# Patient Record
Sex: FEMALE | Race: WHITE | Employment: UNEMPLOYED | URBAN - METROPOLITAN AREA
[De-identification: names, ages, dates, MRNs, and addresses within clinical notes are randomized per-mention and may not be internally consistent; named-entity substitution may affect disease eponyms.]

---

## 2018-11-08 NOTE — PROGRESS NOTES
Sreekanth Cabral is a 21year old female.     CC:  Patient presents with:  Establish Care  Well Adult: NP annual visit, no pap      HPI:  Annual Physical due on 04/30/1997  HPV Vaccines(1 of 3 - Female 3 Dose Series) due on 04/30/2006  Annual Depression Sc date: 2017        Years since quittin.0      Smokeless tobacco: Never Used    Alcohol use: No      Frequency: Never    Drug use: No           Immunization History  Administered            Date(s) Administered    Influenza             2018 masses, HSM or tenderness  :  Deferred to gyn  MUSCULOSKELETAL: back is not tender,FROM of the back  EXTREMITIES: no cyanosis, clubbing or edema  SKIN: no rashes,no suspicious lesions  NEURO: Oriented times three, cranial nerves are intact, motor and sen

## 2018-11-11 PROBLEM — F32.A ANXIETY AND DEPRESSION: Status: ACTIVE | Noted: 2018-11-11

## 2018-11-11 PROBLEM — F41.9 ANXIETY AND DEPRESSION: Status: ACTIVE | Noted: 2018-11-11

## 2018-11-11 PROBLEM — Z86.2 HISTORY OF ANEMIA: Status: ACTIVE | Noted: 2018-11-11

## 2018-11-13 NOTE — TELEPHONE ENCOUNTER
Called and spoke to patient regarding physical form. Forms were faxed at [phone], received confirmation, copy sent to scan. Original form has been picked up by patient.

## 2019-03-04 NOTE — TELEPHONE ENCOUNTER
Medication(s) to Refill:   Requested Prescriptions     Pending Prescriptions Disp Refills   • Levonorgestrel-Ethinyl Estrad 0.1-20 MG-MCG Oral Tab 28 tablet 2     Sig: Take 1 tablet by mouth daily.          Reason for Medication Refill being sent to Provide

## 2019-04-04 NOTE — PROGRESS NOTES
[facility] Family Medicine Office Note  Chief Complaint:   Patient presents with:  Irregular Periods: last menstrual cycle 2 months ago, negative at home pregnancy test, on and off cramps      HPI:   This is a 21year old female coming in for  HP of this encounter: 184 lb. Vital signs reviewed. Appears stated age, well groomed. Physical Exam   Constitutional: She is oriented to person, place, and time. She appears well-developed and well-nourished.    Eyes: Conjunctivae and EOM are normal. Pupils

## 2019-07-09 NOTE — PROGRESS NOTES
Chief Complaint:   Patient presents with:  Ear Pain: left ear pain started today     HPI:She    This is a 25year old female presenting for evaluation of cough/chest congestion and left ear pain. Cough and chest congestion started about 1 week ago.  No ches Oral Tab Take 2 tablets (40 mg total) by mouth daily for 5 days. Disp: 10 tablet Rfl: 0   benzonatate 100 MG Oral Cap Take 1-2 capsules (100-200 mg total) by mouth 3 (three) times daily as needed.  Disp: 30 capsule Rfl: 0   Budesonide-Formoterol Fumarate (S HENT:   Head: Normocephalic and atraumatic. Right Ear: Hearing, tympanic membrane, external ear and ear canal normal.   Nose: Right sinus exhibits maxillary sinus tenderness. Right sinus exhibits no frontal sinus tenderness.  Left sinus exhibits maxilla Symbicort prn.   - Albuterol Sulfate  (90 Base) MCG/ACT Inhalation Aero Soln; Inhale 1-2 puffs into the lungs every 6 (six) hours as needed. -Cxr in 1 week if no improvement, sooner if worsening symptoms.

## 2019-11-26 NOTE — PROGRESS NOTES
Jorge Yanes is a 25year old female. CC:  Patient presents with:   Well Adult: annual visit, no pap smear       HPI:  HPV Vaccines(1 - Female 3-dose series) due on 04/30/2010  Pap Smear,3 Years due on 04/30/2016  Annual Physical due on 11/08/2019 History:   Procedure Laterality Date   • ADENOIDECTOMY     • REMOVAL GALLBLADDER      11/2017   • SPINE SCOLIOSIS SUPINE/UPRIGHT      2010   • TONSILLECTOMY        Family History   Problem Relation Age of Onset   • Other (lung cancer) Father 64   • Other ( denies food or seasonal allergies  PSYCH: no symptoms of depression or anxiety, depression screening negative. EXAM:   /70   Pulse 86   Temp 97.9 °F (36.6 °C) (Oral)   Resp 16   Ht 64\"   Wt 181 lb (82.1 kg)   LMP 11/13/2019   SpO2 99%   BMI 31. tablet (0.25 mg total) by mouth nightly as needed. • traMADol HCl 50 MG Oral Tab 30 tablet 0     Sig: Take 1 tablet (50 mg total) by mouth every 8 (eight) hours as needed for Pain. Imaging & Consults:  None    Return for   1. pap smear   2.  Skin

## 2021-01-21 NOTE — TELEPHONE ENCOUNTER
Patient's most recent labs have been faxed to [address] [place] [address] at [phone], received confirmation

## 2021-03-25 NOTE — TELEPHONE ENCOUNTER
Received Medical records thru fax in [place] today from [facility]. This pt has an appt with you on April 7th -New pt for problem gyne. Please review pt records. Gave to , working in [place] today.

## 2021-04-07 PROBLEM — N94.6 DYSMENORRHEA: Status: ACTIVE | Noted: 2021-04-07

## 2021-04-07 PROBLEM — N83.201 RIGHT OVARIAN CYST: Status: ACTIVE | Noted: 2021-04-07

## 2021-04-07 PROBLEM — N92.6 IRREGULAR MENSES: Status: ACTIVE | Noted: 2021-04-07

## 2021-04-07 NOTE — PROGRESS NOTES
Subjective:  Patient presents with:  Establish Care: PT here to discuss hx of ovarian cyst with pain, BC or possible ablation     22year old female  presents for complaint of painful periods for the past 6 months, irregular bleeding and right ova Systems:  Pertinent items are noted in the HPI.     Objective:  /68   Ht 63.25\"   Wt 145 lb (65.8 kg)   LMP 03/30/2021 (Exact Date)   BMI 25.48 kg/m²    Physical Examination:  General appearance: Well dressed, well nourished in no apparent distress tablet by mouth daily for 28 days. Return for Ultrasound.

## 2021-04-23 NOTE — TELEPHONE ENCOUNTER
Pt requesting results of her ultrasound go to the clinic in [place] [address]. Also would like results of that ultrasound.     Please call

## 2021-04-23 NOTE — PROGRESS NOTES
Based on appearance and increase in size from February, hemorrhagic cyst most likely with endometrioma or dermoid less likely. Recommend follow up ultrasound 6 weeks. Order placed.

## 2021-04-23 NOTE — TELEPHONE ENCOUNTER
Based on appearance and increase in size from February, hemorrhagic cyst most likely with endometrioma or dermoid less likely. Recommend follow up ultrasound 6 weeks. Order placed and patient notified.     Patient reports continued nausea on lower dose OC

## 2021-04-23 NOTE — TELEPHONE ENCOUNTER
From: Piyush Pretty  To: Lizzy Jha MD  Sent: 4/23/2021 2:06 PM CDT  Subject: Test Results Question    Hi,  I see my pelvic ultrasound is in and the cyst is now 5.3 cm. Was curious what my next step will be?    Thank you

## 2021-04-23 NOTE — TELEPHONE ENCOUNTER
Pt had US done today at [address] [facility] [address] and calling for results. Pt sent [employer] message earlier requesting results. Message was forwarded to Dr. Cristino Meredith. Discussed with pt that Dr. Cristino Meredith will review the [address] and make recommendations.   As soon as we have that informat

## 2021-04-27 NOTE — TELEPHONE ENCOUNTER
PT will be sending request form from [place] [phone] to [facility] to release the US results to [place] [id number]

## 2021-04-28 NOTE — TELEPHONE ENCOUNTER
JOSE ALEJANDRO received in plfd. Can nurse release our ultrasound report dos 4/23/21 or does it have to go to MR Dept?      Form at

## 2021-04-29 NOTE — TELEPHONE ENCOUNTER
JOSE ALEJANDRO requesting radiology report AND images. Will need to obtain images from medical records. Copy of report given to [facility] staff to fax.

## 2021-04-30 NOTE — TELEPHONE ENCOUNTER
Nurse printed 4/23/21 ultrasound report. Faxed that to [facility] as requested on JOSE ALEJANDRO at [phone]. Faxed JOSE ALEJANDRO to scan stat for images of ultrasound to be sent. Faxed to [facility] scan stat [phone]. Copy in plfd.

## 2021-06-02 NOTE — TELEPHONE ENCOUNTER
Patient stopped taking OCP because of nausea. Reminded to schedule follow up ultrasound for follow up ovarian cyst.  Reviewed records from previous OB.   Patient had Mirena IUD in 2017 and had discussed both laparoscopy to rule out endometriosis and endome

## 2024-03-25 ENCOUNTER — OFFICE VISIT (OUTPATIENT)
Dept: FAMILY MEDICINE CLINIC | Facility: CLINIC | Age: 29
End: 2024-03-25
Payer: COMMERCIAL

## 2024-03-25 VITALS
HEART RATE: 88 BPM | OXYGEN SATURATION: 98 % | RESPIRATION RATE: 16 BRPM | DIASTOLIC BLOOD PRESSURE: 70 MMHG | SYSTOLIC BLOOD PRESSURE: 110 MMHG | BODY MASS INDEX: 24.98 KG/M2 | WEIGHT: 141 LBS | HEIGHT: 63 IN

## 2024-03-25 DIAGNOSIS — R94.31 ABNORMAL EKG: ICD-10-CM

## 2024-03-25 DIAGNOSIS — Q21.10 ASD (ATRIAL SEPTAL DEFECT) (HCC): ICD-10-CM

## 2024-03-25 DIAGNOSIS — Z01.818 PRE-OP EXAM: ICD-10-CM

## 2024-03-25 DIAGNOSIS — Z01.818 PREOP GENERAL PHYSICAL EXAM: Primary | ICD-10-CM

## 2024-03-25 LAB
ATRIAL RATE: 77 BPM
P AXIS: 42 DEGREES
P-R INTERVAL: 142 MS
Q-T INTERVAL: 402 MS
QRS DURATION: 94 MS
QTC CALCULATION (BEZET): 454 MS
R AXIS: 0 DEGREES
T AXIS: 20 DEGREES
VENTRICULAR RATE: 77 BPM

## 2024-03-25 RX ORDER — DULOXETIN HYDROCHLORIDE 60 MG/1
60 CAPSULE, DELAYED RELEASE ORAL DAILY
COMMUNITY

## 2024-03-25 RX ORDER — CYCLOBENZAPRINE HCL 10 MG
10 TABLET ORAL 3 TIMES DAILY PRN
COMMUNITY

## 2024-03-25 NOTE — PROGRESS NOTES
CC: Preop exam.    Jocelyn Lorenz is a 28 year old female who presents for a pre-operative physical exam  By Dr. Eugenio huynh. Patient is to have laparoscopic excision of endometriosis  to be on 4/8/24  No prior anaesthesia problem.      Current Outpatient Medications   Medication Sig Dispense Refill    DULoxetine 60 MG Oral Cap DR Particles Take 1 capsule (60 mg total) by mouth daily.      cyclobenzaprine 10 MG Oral Tab Take 1 tablet (10 mg total) by mouth 3 (three) times daily as needed for Muscle spasms.        Allergies: No Known Allergies   Past Medical History:   Diagnosis Date    Anxiety     Depression     Scoliosis       Past Surgical History:   Procedure Laterality Date    ADENOIDECTOMY      REMOVAL GALLBLADDER      11/2017    SPINE SCOLIOSIS SUPINE/UPRIGHT      2010    TONSILLECTOMY        Family History   Problem Relation Age of Onset    Other (lung cancer) Father 56    Other (thyroid (hypo)) Maternal Grandmother     Heart Disease Maternal Grandfather     Other (lung cancer) Paternal Grandfather       Social History:   Social History     Socioeconomic History    Marital status:    Tobacco Use    Smoking status: Every Day    Smokeless tobacco: Never   Vaping Use    Vaping Use: Never used   Substance and Sexual Activity    Alcohol use: No    Drug use: No    Sexual activity: Yes     Partners: Male     Birth control/protection: Pill   Other Topics Concern    Caffeine Concern Yes     Comment: 1 cup daily     Exercise Yes     Comment: walking and biking     Seat Belt Yes           REVIEW OF SYSTEMS:   GENERAL: feels well otherwise  SKIN: denies any unusual skin lesions  EYES:denies blurred vision or double vision  HEENT: denies nasal congestion, sinus pain or ST  LUNGS: denies shortness of breath with exertion  CARDIOVASCULAR: denies chest pain on exertion  GI: denies abdominal pain,denies heartburn  : no abnormal discharge  MUSCULOSKELETAL: denies back pain  NEURO: denies headaches  PSYCHE: denies  depression or anxiety  HEMATOLOGIC: denies hx of anemia  ENDOCRINE: denies thyroid history  ALL/ASTHMA: denies hx of allergy or asthma    EXAM:   /70   Pulse 88   Resp 16   Ht 5' 3\" (1.6 m)   Wt 141 lb (64 kg)   SpO2 98%   BMI 24.98 kg/m²   GENERAL: well developed,in no apparent distress  SKIN: no rashes,no suspicious lesions  HEENT: atraumatic, normocephalic,ears and throat are clear  EYES:PERRL, EOMI, normal optic disk,conjunctiva are clear  NECK: supple,no adenopathy,no bruits  CHEST: no chest tenderness  LUNGS: clear to auscultation  CARDIO: RRR without murmur  GI: good BS's,no masses, HSM or tenderness  MUSCULOSKELETAL: back is not tender,FROM of the back  EXTREMITIES: no cyanosis, clubbing or edema  NEURO: Oriented times three,cranial nerves are intact,motor and sensory are grossly intact    ASSESSMENT AND PLAN:   Jocelyn Lorenz is a 28 year old female who presents for a pre-operative physical exam. Labs stable, EKG  abnormal -echo and stress pending   Diagnoses and all orders for this visit:    Preop general physical exam  -     CARD ECHO 2D DOPPLER (CPT=93306); Future  -     CARD ECHO STRESS ECHO/REST AND STRESS(CPT=93350/85710 DMG 98471); Future    Pre-op exam  -     CBC With Differential With Platelet; Future  -     Comp Metabolic Panel (14); Future  -     EKG with interpretation and Report -IN OFFICE [19617]    Abnormal EKG  -     CARD ECHO 2D DOPPLER (CPT=93306); Future    ASD (atrial septal defect) (HCC)  -     CARD ECHO 2D DOPPLER (CPT=93306); Future  -     CARD ECHO STRESS ECHO/REST AND STRESS(CPT=93350/74077 DMG 66524); Future     Stable   F/u for worsening symptoms

## 2024-03-26 ENCOUNTER — HOSPITAL ENCOUNTER (OUTPATIENT)
Dept: CV DIAGNOSTICS | Facility: HOSPITAL | Age: 29
Discharge: HOME | End: 2024-03-26
Attending: NURSE PRACTITIONER
Payer: COMMERCIAL

## 2024-03-26 DIAGNOSIS — Q21.10 ASD (ATRIAL SEPTAL DEFECT) (HCC): ICD-10-CM

## 2024-03-26 DIAGNOSIS — Z01.818 PREOP GENERAL PHYSICAL EXAM: ICD-10-CM

## 2024-03-26 DIAGNOSIS — R94.31 ABNORMAL EKG: ICD-10-CM

## 2024-03-26 PROCEDURE — 93018 CV STRESS TEST I&R ONLY: CPT | Performed by: NURSE PRACTITIONER

## 2024-03-26 PROCEDURE — 93350 STRESS TTE ONLY: CPT | Performed by: NURSE PRACTITIONER

## 2024-03-26 PROCEDURE — 93017 CV STRESS TEST TRACING ONLY: CPT | Performed by: NURSE PRACTITIONER

## 2024-03-26 PROCEDURE — 93306 TTE W/DOPPLER COMPLETE: CPT | Performed by: NURSE PRACTITIONER

## 2024-03-28 ENCOUNTER — PATIENT MESSAGE (OUTPATIENT)
Dept: FAMILY MEDICINE CLINIC | Facility: CLINIC | Age: 29
End: 2024-03-28

## 2024-03-28 ENCOUNTER — MED REC SCAN ONLY (OUTPATIENT)
Dept: FAMILY MEDICINE CLINIC | Facility: CLINIC | Age: 29
End: 2024-03-28

## 2024-03-28 NOTE — TELEPHONE ENCOUNTER
From: Jocelyn Lorenz  To: Dwayne Encarnacion  Sent: 3/28/2024 11:52 AM CDT  Subject: Pre op    Mitch Rice, I just wanted to make you aware I saw cardiology and I will now be going for ACE and then following up with him on the 18th. I’m sure you can see records or they will fax you something but just wanted to let you know!

## 2024-04-04 NOTE — PAT NURSING NOTE
Per PAT encounter/[employer] message sent to pt:    PreOp Instructions     You are scheduled for: a Cardiac Procedure     Date of Procedure: 04/08/24 Monday     Diet Instructions: Do not eat or drink anything after midnight     Medications: Medications you are allowed to take can be taken with a sip of water the morning of your procedure     Medications to Stop: Hold herbal supplements and vitamins morning of the procedure (in case you start taking something new).      Arrival Time: The Friday prior to your procedure you will receive a phone call before 6:00 pm with your arrival time. If you haven't received a phone call, please check your voicemail messages., If you did not receive a voice mail and it is after 6:00 pm, please call the nursing supervisor at [phone].    Driving After Procedure: If sedation is given, you WILL NOT be able to drive home. You will need a responsible adult  to drive you home., Cannot take uber or cab unless approved by physician     Discharge Teaching: Your nurse will give you specific instructions before discharge, Most people can resume normal activities in 2-3 days, Any questions, please call the physician's office      parking is available starting at 6 am or park in the [place] parking garage at [facility]. Check in at the [facility] reception desk. Our  will be there to check you in for your procedure. Please bring your insurance cards and ID with you.                                                                                                                                      Please DO NOT respond to this message, the inbasket is not monitored for messages. For any questions, please call the physician's office.

## 2024-04-08 ENCOUNTER — HOSPITAL ENCOUNTER (OUTPATIENT)
Dept: CV DIAGNOSTICS | Facility: HOSPITAL | Age: 29
Discharge: HOME | End: 2024-04-08
Attending: INTERNAL MEDICINE
Payer: COMMERCIAL

## 2024-04-08 ENCOUNTER — HOSPITAL ENCOUNTER (OUTPATIENT)
Dept: INTERVENTIONAL RADIOLOGY/VASCULAR | Facility: HOSPITAL | Age: 29
Discharge: HOME | End: 2024-04-08
Attending: INTERNAL MEDICINE | Admitting: INTERNAL MEDICINE
Payer: COMMERCIAL

## 2024-04-08 VITALS
HEIGHT: 63 IN | WEIGHT: 140 LBS | TEMPERATURE: 98 F | SYSTOLIC BLOOD PRESSURE: 102 MMHG | HEART RATE: 66 BPM | RESPIRATION RATE: 12 BRPM | OXYGEN SATURATION: 99 % | BODY MASS INDEX: 24.8 KG/M2 | DIASTOLIC BLOOD PRESSURE: 69 MMHG

## 2024-04-08 DIAGNOSIS — Q21.10 ASD (ATRIAL SEPTAL DEFECT) (HCC): ICD-10-CM

## 2024-04-08 PROCEDURE — 93320 DOPPLER ECHO COMPLETE: CPT | Performed by: INTERNAL MEDICINE

## 2024-04-08 PROCEDURE — B24BZZ4 ULTRASONOGRAPHY OF HEART WITH AORTA, TRANSESOPHAGEAL: ICD-10-PCS | Performed by: INTERNAL MEDICINE

## 2024-04-08 PROCEDURE — 99152 MOD SED SAME PHYS/QHP 5/>YRS: CPT | Performed by: INTERNAL MEDICINE

## 2024-04-08 PROCEDURE — 93312 ECHO TRANSESOPHAGEAL: CPT | Performed by: INTERNAL MEDICINE

## 2024-04-08 PROCEDURE — 93325 DOPPLER ECHO COLOR FLOW MAPG: CPT | Performed by: INTERNAL MEDICINE

## 2024-04-08 RX ORDER — MIDAZOLAM HYDROCHLORIDE 1 MG/ML
INJECTION INTRAMUSCULAR; INTRAVENOUS
Status: COMPLETED
Start: 2024-04-08 | End: 2024-04-08

## 2024-04-08 RX ORDER — MIDAZOLAM HYDROCHLORIDE 1 MG/ML
1 INJECTION INTRAMUSCULAR; INTRAVENOUS EVERY 5 MIN PRN
Status: DISCONTINUED | OUTPATIENT
Start: 2024-04-08 | End: 2024-04-08

## 2024-04-08 RX ORDER — SODIUM CHLORIDE 9 MG/ML
INJECTION, SOLUTION INTRAVENOUS
Status: COMPLETED | OUTPATIENT
Start: 2024-04-09 | End: 2024-04-08

## 2024-04-08 RX ADMIN — SODIUM CHLORIDE: 9 INJECTION, SOLUTION INTRAVENOUS at 12:30:00

## 2024-04-08 RX ADMIN — MIDAZOLAM HYDROCHLORIDE 5 MG: 1 INJECTION INTRAMUSCULAR; INTRAVENOUS at 13:11:00

## 2024-04-08 NOTE — H&P
History & Physical Examination    Patient Name: Jocelyn Lorenz  MRN: [id number]  CSN: [id number]  YOB: 1995    Diagnosis: ASD    History of Present Illness: 29 yo with ?ASD here for ACE.  Denies difficulty swallowing or bleeding problems. No CP or SOB.    Medications Prior to Admission   Medication Sig Dispense Refill Last Dose    DULoxetine 60 MG Oral Cap DR Particles Take 1 capsule (60 mg total) by mouth daily.   4/7/2024    cyclobenzaprine 10 MG Oral Tab Take 1 tablet (10 mg total) by mouth 3 (three) times daily as needed for Muscle spasms.   4/7/2024       Allergies: No Known Allergies    Past Medical History:   Diagnosis Date    Anxiety     Depression     Scoliosis      Past Surgical History:   Procedure Laterality Date    ADENOIDECTOMY      HYSTERECTOMY  2022    REMOVAL GALLBLADDER      11/2017    SPINE SCOLIOSIS SUPINE/UPRIGHT      2010    TONSILLECTOMY       Family History   Problem Relation Age of Onset    Other (lung cancer) Father 56    Other (thyroid (hypo)) Maternal Grandmother     Heart Disease Maternal Grandfather     Other (lung cancer) Paternal Grandfather      Social History     Tobacco Use    Smoking status: Every Day    Smokeless tobacco: Never   Substance Use Topics    Alcohol use: No       SYSTEM Check if Review is Normal Check if Physical Exam is Normal If not normal, please explain:   HEENT [ x] [x ]    NECK & BACK [x ] [x ]    HEART [x ] [x ]    LUNGS [x ] [x ]    ABDOMEN [x ] [ x]    UROGENITAL [x ] [ defer]    EXTREMITIES [x ] [x ]    OTHER        Plan:   ( x)  I have discussed the risks,  benefits, and alternatives with the patient/family, they understand and agree to proceed with plan of care for 3D AEC with color flow and doppler.     Lambert John MD  4/8/2024  1:12 PM

## 2024-04-08 NOTE — PROGRESS NOTES
Pt s/p ACE. Pt tolerated procedure well. Suctioned clear secretions from mouth. VSS. Denied pain. Tolerated PO intake. IV d/c'd. Discharge instructions given-pt verbalized understanding. Pt to lobby via WC and  to drive home

## 2024-04-08 NOTE — PROCEDURES
Cardiology Transesophageal Echo Note    PRE-PROCEDURE DIAGNOSIS: ASD    PROCEDURE: Transesophageal Echocardiogram.    SEDATION:   Topical spray x 1  Versed: 4 mg  Fentanyl: 100 mcg    I personally supervised the intravenous administration of   conscious sedation.  This was performed with continuous   respiratory, hemodynamic, and electrocardiographic monitoring.    Sedation was supervised from 1:11 PM - 1:26 PM, a total of 15   minutes.    DESCRIPTION:   Informed consent was obtained after risks and benefits of the procedure were explained. Oral hurricaine spray was placed in the oropharynx. Conscious sedation was given.  After adequate anesthesia, the ACE probe was placed in the oropharynx with the esophagus being successfully intubated. Standard transgastric and multiplane views were obtained and available findings are as follows:    COMPLICATIONS: none    FINDINGS:   Overall normal LVSF without wall motion abnormalities. Chamber sizes were within normal limits.   Normal mitral, tricuspid and pulmonic valves were seen.   There was a trileaflet aortic valve without stenosis or insufficiency.   Adequate velocities were seen in the LING. There was no evidence for intracardiac mass or thrombus.   There was no evidence for reversal of flow in the pulmonary veins.   An agitated saline study and color Doppler flow interrogation of the intra-atrial septum was performed and there was no intracardiac shunting to suggest an ASD or PFO.  The aorta was evaluated. There was no evidence for mobile atheromata or thrombus.      Lambert John MD  4/8/2024  1:13 PM

## 2024-04-18 NOTE — H&P
Reviewed H&P. No changes. R/B/A D/W patient. Consent obtained. Proceed with ASD closure as planned.

## 2024-05-15 NOTE — PAT NURSING NOTE
Per PAT encounter/[employer] message sent to pt:    PreOp Instructions     You are scheduled for: a Cardiac Procedure     Date of Procedure: 05/17/24 Friday     Diet Instructions: Do not eat or drink anything after midnight     Medications: Medications you are allowed to take can be taken with a sip of water the morning of your procedure     Skin Prep: Shower with antibacterial soap using a clean washcloth, prior to procedure     Arrival Time: The day prior to your procedure (Thursday) you will receive a phone call before 6:00 pm with your arrival time. If you haven't received a phone call, please check your voicemail messages., If you did not receive a voice mail and it is after 6:00 pm, please call the nursing supervisor at [phone].    Driving After Procedure: If sedation is given, you WILL NOT be able to drive home. You will need a responsible adult  to drive you home., Cannot take uber or cab unless approved by physician     Discharge Teaching: Your nurse will give you specific instructions before discharge, Most people can resume normal activities in 2-3 days, Any questions, please call the physician's office      parking is available starting at 6 am or park in the [place] parking garage at [facility]. Check in at the [facility] reception desk. Our  will be there to check you in for your procedure. Please bring your insurance cards and ID with you.                                                                                                                                      Please DO NOT respond to this message, the inbasket is not monitored for messages. For any questions, please call the physician's office.

## 2024-05-17 ENCOUNTER — APPOINTMENT (OUTPATIENT)
Dept: CV DIAGNOSTICS | Facility: HOSPITAL | Age: 29
End: 2024-05-17
Attending: INTERNAL MEDICINE
Payer: COMMERCIAL

## 2024-05-17 ENCOUNTER — HOSPITAL ENCOUNTER (OUTPATIENT)
Dept: INTERVENTIONAL RADIOLOGY/VASCULAR | Facility: HOSPITAL | Age: 29
Discharge: HOME | End: 2024-05-17
Attending: INTERNAL MEDICINE | Admitting: INTERNAL MEDICINE
Payer: COMMERCIAL

## 2024-05-17 ENCOUNTER — HOSPITAL ENCOUNTER (OUTPATIENT)
Dept: CV DIAGNOSTICS | Facility: HOSPITAL | Age: 29
Discharge: HOME | End: 2024-05-17
Attending: INTERNAL MEDICINE
Payer: COMMERCIAL

## 2024-05-17 VITALS
BODY MASS INDEX: 24.8 KG/M2 | RESPIRATION RATE: 11 BRPM | HEART RATE: 56 BPM | OXYGEN SATURATION: 97 % | WEIGHT: 140 LBS | DIASTOLIC BLOOD PRESSURE: 67 MMHG | SYSTOLIC BLOOD PRESSURE: 103 MMHG | TEMPERATURE: 99 F | HEIGHT: 63 IN

## 2024-05-17 DIAGNOSIS — Q21.10 ASD (ATRIAL SEPTAL DEFECT) (HCC): ICD-10-CM

## 2024-05-17 LAB
ISTAT ACTIVATED CLOTTING TIME: 287 SECONDS (ref 74–137)
ISTAT ACTIVATED CLOTTING TIME: 325 SECONDS (ref 74–137)

## 2024-05-17 PROCEDURE — 02U53JZ SUPPLEMENT ATRIAL SEPTUM WITH SYNTHETIC SUBSTITUTE, PERCUTANEOUS APPROACH: ICD-10-PCS | Performed by: INTERNAL MEDICINE

## 2024-05-17 PROCEDURE — 85347 COAGULATION TIME ACTIVATED: CPT

## 2024-05-17 PROCEDURE — 99153 MOD SED SAME PHYS/QHP EA: CPT | Performed by: INTERNAL MEDICINE

## 2024-05-17 PROCEDURE — 93320 DOPPLER ECHO COMPLETE: CPT | Performed by: INTERNAL MEDICINE

## 2024-05-17 PROCEDURE — B24BZZZ ULTRASONOGRAPHY OF HEART WITH AORTA: ICD-10-PCS | Performed by: INTERNAL MEDICINE

## 2024-05-17 PROCEDURE — 93325 DOPPLER ECHO COLOR FLOW MAPG: CPT | Performed by: INTERNAL MEDICINE

## 2024-05-17 PROCEDURE — 99152 MOD SED SAME PHYS/QHP 5/>YRS: CPT | Performed by: INTERNAL MEDICINE

## 2024-05-17 PROCEDURE — 93308 TTE F-UP OR LMTD: CPT | Performed by: INTERNAL MEDICINE

## 2024-05-17 PROCEDURE — B24BZZ4 ULTRASONOGRAPHY OF HEART WITH AORTA, TRANSESOPHAGEAL: ICD-10-PCS | Performed by: INTERNAL MEDICINE

## 2024-05-17 PROCEDURE — 93580 TRANSCATH CLOSURE OF ASD: CPT | Performed by: INTERNAL MEDICINE

## 2024-05-17 PROCEDURE — 93662 INTRACARDIAC ECG (ICE): CPT | Performed by: INTERNAL MEDICINE

## 2024-05-17 RX ORDER — CLOPIDOGREL BISULFATE 75 MG/1
TABLET ORAL
Status: COMPLETED
Start: 2024-05-17 | End: 2024-05-17

## 2024-05-17 RX ORDER — MIDAZOLAM HYDROCHLORIDE 1 MG/ML
INJECTION INTRAMUSCULAR; INTRAVENOUS
Status: COMPLETED
Start: 2024-05-17 | End: 2024-05-17

## 2024-05-17 RX ORDER — ONDANSETRON 2 MG/ML
INJECTION INTRAMUSCULAR; INTRAVENOUS
Status: COMPLETED
Start: 2024-05-17 | End: 2024-05-17

## 2024-05-17 RX ORDER — CLOPIDOGREL BISULFATE 75 MG/1
75 TABLET ORAL DAILY
Qty: 90 TABLET | Refills: 0 | Status: SHIPPED | OUTPATIENT
Start: 2024-05-17

## 2024-05-17 RX ORDER — HEPARIN SODIUM 5000 [USP'U]/ML
INJECTION, SOLUTION INTRAVENOUS; SUBCUTANEOUS
Status: COMPLETED
Start: 2024-05-17 | End: 2024-05-17

## 2024-05-17 RX ORDER — LIDOCAINE HYDROCHLORIDE 10 MG/ML
INJECTION, SOLUTION EPIDURAL; INFILTRATION; INTRACAUDAL; PERINEURAL
Status: COMPLETED
Start: 2024-05-17 | End: 2024-05-17

## 2024-05-17 RX ORDER — ASPIRIN 81 MG/1
324 TABLET, CHEWABLE ORAL ONCE
Status: COMPLETED | OUTPATIENT
Start: 2024-05-17 | End: 2024-05-17

## 2024-05-17 RX ORDER — SODIUM CHLORIDE 9 MG/ML
INJECTION, SOLUTION INTRAVENOUS
Status: DISCONTINUED | OUTPATIENT
Start: 2024-05-18 | End: 2024-05-17 | Stop reason: HOSPADM

## 2024-05-17 RX ORDER — ASPIRIN 81 MG/1
81 TABLET, CHEWABLE ORAL DAILY
Qty: 90 TABLET | Refills: 0 | Status: SHIPPED | OUTPATIENT
Start: 2024-05-17

## 2024-05-17 RX ADMIN — ASPIRIN 324 MG: 81 TABLET, CHEWABLE ORAL at 10:00:00

## 2024-05-17 NOTE — DISCHARGE INSTRUCTIONS
HOME CARE INSTRUCTIONS FOLLOWING VENOUS ACCESS PROCEDURES:   MYOCARDIAL BIOPSY, RIGHT HEART CATHETERIZATION, VENAGRAM, IVC FILTER INSERTION, CLOSURE OF ASD OR PFO     Activity    DO NOT drive after the procedure. You may resume driving the following day according to the nurse or physician's instructions    Plan on resting and relaxing tonight and tomorrow    Do not lift anything over 10 pounds for the next 24 hours    Avoid sexual activity for the next 24 hours    Avoid drinking alcohol for the next 24 hours    Resume your normal activity after 24 hours, or as instructed by your physician     What is Normal?    The procedure site may appear bruised or discolored    The procedure site may be tender to the touch    There may be a small amount of drainage on the bandage     Special Instructions    The bandage is to remain in place for 24 hours    After 24 hours, you must remove the bandage. You should shower after removing the bandage, and wash the procedure site gently with soap and water. (If you choose to wear a bandage for a few days, make sure it remains clean and dry and that it is changed daily.)     When you should NOTIFY YOUR PHYSICIAN    If you have shortness of breath or a persistent cough    If you have chest pain (angina)    If you have palpitations or irregular heart beats    If you have persistent pain at the procedure site    If you experience signs of a fever, temperature >101o, chills, infection (redness, swelling, thick yellow drainage, or a foul odor from the procedure site)     Other    You may resume your present diet, unless otherwise specified by your physician    You may resume all of your medications as prescribed, unless otherwise directed by your physician. A list of your medications was provided to you at discharge    Please call your physician's office for a follow-up appointment. You should be seen in 1 to 2 weeks       For TONIGHT only, call/text [phone] with any questions or  concerns.

## 2024-05-17 NOTE — IVS NOTE
Patient discharged home post ASD closure procedure. VSS. Right groin site clean, dry and intact. PT able to ambulate in hallway without difficulty. Limited echo completed and WNL. AVS reviewed. Patient received aspirin and Plavix prescription from [place] pharmacy. Dr. Briscoe spoke to family post procedure. PIV removed. Discharged via wheelchair with all belongings. Spouse driving home.

## 2024-05-17 NOTE — PROCEDURES
[facility]   [facility] Cardiac [facility] Procedure Note  Jocelyn Lorenz Patient Status:  Outpatient    1995 MRN [id number]   Location [facility] INTERVENTIONAL SUITES Attending Mohamud Briscoe MD   Hosp Day # 0 PCP JEVON ARCIHBALD MD       Cardiologist: Mohamud Briscoe MD  Primary Proceduralist: Mohamud Briscoe MD  Procedure Performed: ASD closure, ICE  Date of Procedure: 2024   Indication: ASD with right sided volume overload    Summary of findings:  Successful closure of ASD with 37 mm cardioform ASD device.      Assessment:  ASD s/p successful closure    Description of Procedure:   After written informed consent was obtained from the patient, patient was brought to the cardiac catheterization laboratory.  Patient was prepped and draped in the usual sterile fashion. Lidocaine 1% was used to infiltrate the right groin.     Right femoral vein accessed under US, J-wire advanced to the right atrium and 6 fr sheath placed.  Single perclose device placed, J-wire replaced into the RA    Right femoral vein accessed under US, J-wire advanced to the right atrium and 10 Fr 25cm sheath was placed.  Wire was removed. ICE was advanced into RA and multiple measurements of ASD were performed.    Heparin given. 6 Fr MP and J wire crossed ASD into pulmonary vein. Exchanged for amplatz wire. 14 Fr sheath inserted.     Attempted balloon inflation for sizing, but could not ascertain size to the balloon sliding several times. Decision made to use a 37mm cardioform ASD device.      #37 mm CARDIOFORM ASD device was prepped, ACT >230 once device advanced to RA.  Left atrial disc was deployed in the left atrium under ACE and fluoroscopy guidance, pulled back against the interatrial septum and then the right disc was deployed.  Positioning checked on fluoroscopym ICE and PFO, unlocked and deployed.    Excellent positioning of device      Specimen sent to: No specimen collected  Estimated  blood loss: 10 cc  Closure:  Perclose of 14 Fr sheath. Vascaid of 10 Fr sheath.      IV was maintained by RN and moderate conscious sedation of versed and fentanyl was given.  Patient was assessed and monitoring of oxygen, heart rate and blood pressure by nurse and myself during the exam 70 minutes.      Mohamud Briscoe MD  05/17/24

## 2024-06-13 ENCOUNTER — HOSPITAL ENCOUNTER (OUTPATIENT)
Dept: LAB | Facility: HOSPITAL | Age: 29
Discharge: HOME | End: 2024-06-13
Attending: NURSE PRACTITIONER

## 2024-06-13 LAB
ERYTHROCYTE [DISTWIDTH] IN BLOOD BY AUTOMATED COUNT: 11.9 %
HCT VFR BLD AUTO: 41.5 %
HGB BLD-MCNC: 13.9 G/DL
MCH RBC QN AUTO: 30.5 PG (ref 26–34)
MCHC RBC AUTO-ENTMCNC: 33.5 G/DL (ref 31–37)
MCV RBC AUTO: 91 FL
PLATELET # BLD AUTO: 161 10(3)UL (ref 150–450)
RBC # BLD AUTO: 4.56 X10(6)UL
WBC # BLD AUTO: 6.3 X10(3) UL (ref 4–11)

## 2024-06-13 PROCEDURE — 85027 COMPLETE CBC AUTOMATED: CPT | Performed by: NURSE PRACTITIONER

## 2024-06-13 PROCEDURE — 36415 COLL VENOUS BLD VENIPUNCTURE: CPT | Performed by: NURSE PRACTITIONER

## 2024-07-05 ENCOUNTER — HOSPITAL ENCOUNTER (OUTPATIENT)
Dept: CV DIAGNOSTICS | Age: 29
Discharge: HOME | End: 2024-07-05
Attending: INTERNAL MEDICINE
Payer: COMMERCIAL

## 2024-07-05 DIAGNOSIS — R94.31 ABNORMAL EKG: ICD-10-CM

## 2024-07-05 DIAGNOSIS — Q21.10 ASD (ATRIAL SEPTAL DEFECT) (HCC): ICD-10-CM

## 2024-07-05 PROCEDURE — 93306 TTE W/DOPPLER COMPLETE: CPT | Performed by: INTERNAL MEDICINE

## 2024-07-12 ENCOUNTER — HOSPITAL ENCOUNTER (OUTPATIENT)
Dept: CT IMAGING | Age: 29
Discharge: HOME | End: 2024-07-12
Attending: NURSE PRACTITIONER
Payer: COMMERCIAL

## 2024-07-12 DIAGNOSIS — Z79.01 LONG TERM (CURRENT) USE OF ANTICOAGULANTS: ICD-10-CM

## 2024-07-12 DIAGNOSIS — Q21.10 ASD (ATRIAL SEPTAL DEFECT) (HCC): ICD-10-CM

## 2024-07-12 DIAGNOSIS — R51.9 ACUTE INTRACTABLE HEADACHE: ICD-10-CM

## 2024-07-12 PROCEDURE — 70450 CT HEAD/BRAIN W/O DYE: CPT | Performed by: NURSE PRACTITIONER

## 2024-07-29 ENCOUNTER — MED REC SCAN ONLY (OUTPATIENT)
Dept: FAMILY MEDICINE CLINIC | Facility: CLINIC | Age: 29
End: 2024-07-29

## 2024-10-02 ENCOUNTER — TELEPHONE (OUTPATIENT)
Dept: FAMILY MEDICINE CLINIC | Facility: CLINIC | Age: 29
End: 2024-10-02

## 2025-02-24 ENCOUNTER — OFFICE VISIT (OUTPATIENT)
Dept: FAMILY MEDICINE CLINIC | Facility: CLINIC | Age: 30
End: 2025-02-24
Payer: COMMERCIAL

## 2025-02-24 ENCOUNTER — LAB ENCOUNTER (OUTPATIENT)
Dept: LAB | Age: 30
End: 2025-02-24
Attending: NURSE PRACTITIONER
Payer: COMMERCIAL

## 2025-02-24 ENCOUNTER — HOSPITAL ENCOUNTER (OUTPATIENT)
Dept: GENERAL RADIOLOGY | Age: 30
Discharge: HOME | End: 2025-02-24
Attending: NURSE PRACTITIONER
Payer: COMMERCIAL

## 2025-02-24 VITALS
DIASTOLIC BLOOD PRESSURE: 80 MMHG | RESPIRATION RATE: 16 BRPM | SYSTOLIC BLOOD PRESSURE: 110 MMHG | WEIGHT: 162 LBS | BODY MASS INDEX: 28.7 KG/M2 | OXYGEN SATURATION: 98 % | HEART RATE: 100 BPM | HEIGHT: 63 IN

## 2025-02-24 DIAGNOSIS — R31.9 HEMATURIA, UNSPECIFIED TYPE: ICD-10-CM

## 2025-02-24 DIAGNOSIS — R04.2 HEMOPTYSIS: ICD-10-CM

## 2025-02-24 DIAGNOSIS — R04.2 HEMOPTYSIS: Primary | ICD-10-CM

## 2025-02-24 LAB
ALBUMIN SERPL-MCNC: 4.5 G/DL (ref 3.2–4.8)
ALBUMIN/GLOB SERPL: 1.6 {RATIO} (ref 1–2)
ALP LIVER SERPL-CCNC: 56 U/L
ALT SERPL-CCNC: 17 U/L
ANION GAP SERPL CALC-SCNC: 3 MMOL/L (ref 0–18)
APPEARANCE: CLEAR
AST SERPL-CCNC: 17 U/L (ref ?–34)
BASOPHILS # BLD AUTO: 0.02 X10(3) UL (ref 0–0.2)
BASOPHILS NFR BLD AUTO: 0.3 %
BILIRUB SERPL-MCNC: 0.3 MG/DL (ref 0.3–1.2)
BILIRUBIN: NEGATIVE
BUN BLD-MCNC: 10 MG/DL (ref 9–23)
CALCIUM BLD-MCNC: 9.1 MG/DL (ref 8.7–10.6)
CHLORIDE SERPL-SCNC: 104 MMOL/L (ref 98–112)
CO2 SERPL-SCNC: 28 MMOL/L (ref 21–32)
CREAT BLD-MCNC: 0.89 MG/DL
EGFRCR SERPLBLD CKD-EPI 2021: 90 ML/MIN/1.73M2 (ref 60–?)
EOSINOPHIL # BLD AUTO: 0.12 X10(3) UL (ref 0–0.7)
EOSINOPHIL NFR BLD AUTO: 1.8 %
ERYTHROCYTE [DISTWIDTH] IN BLOOD BY AUTOMATED COUNT: 11.9 %
FASTING STATUS PATIENT QL REPORTED: YES
GLOBULIN PLAS-MCNC: 2.8 G/DL (ref 2–3.5)
GLUCOSE (URINE DIPSTICK): NEGATIVE MG/DL
GLUCOSE BLD-MCNC: 82 MG/DL (ref 70–99)
HCT VFR BLD AUTO: 38.2 %
HGB BLD-MCNC: 12.8 G/DL
IMM GRANULOCYTES # BLD AUTO: 0.02 X10(3) UL (ref 0–1)
IMM GRANULOCYTES NFR BLD: 0.3 %
KETONES (URINE DIPSTICK): NEGATIVE MG/DL
LEUKOCYTES: NEGATIVE
LYMPHOCYTES # BLD AUTO: 2.09 X10(3) UL (ref 1–4)
LYMPHOCYTES NFR BLD AUTO: 31.4 %
MCH RBC QN AUTO: 29.8 PG (ref 26–34)
MCHC RBC AUTO-ENTMCNC: 33.5 G/DL (ref 31–37)
MCV RBC AUTO: 88.8 FL
MONOCYTES # BLD AUTO: 0.44 X10(3) UL (ref 0.1–1)
MONOCYTES NFR BLD AUTO: 6.6 %
MULTISTIX LOT#: NORMAL NUMERIC
NEUTROPHILS # BLD AUTO: 3.96 X10 (3) UL (ref 1.5–7.7)
NEUTROPHILS # BLD AUTO: 3.96 X10(3) UL (ref 1.5–7.7)
NEUTROPHILS NFR BLD AUTO: 59.6 %
NITRITE, URINE: NEGATIVE
OCCULT BLOOD: NEGATIVE
OSMOLALITY SERPL CALC.SUM OF ELEC: 278 MOSM/KG (ref 275–295)
PH, URINE: 7 (ref 4.5–8)
PLATELET # BLD AUTO: 176 10(3)UL (ref 150–450)
POTASSIUM SERPL-SCNC: 3.4 MMOL/L (ref 3.5–5.1)
PROT SERPL-MCNC: 7.3 G/DL (ref 5.7–8.2)
PROTEIN (URINE DIPSTICK): NEGATIVE MG/DL
RBC # BLD AUTO: 4.3 X10(6)UL
SODIUM SERPL-SCNC: 135 MMOL/L (ref 136–145)
SPECIFIC GRAVITY: 1.02 (ref 1–1.03)
URINE-COLOR: YELLOW
UROBILINOGEN,SEMI-QN: 1 MG/DL (ref 0–1.9)
WBC # BLD AUTO: 6.7 X10(3) UL (ref 4–11)

## 2025-02-24 PROCEDURE — 80053 COMPREHEN METABOLIC PANEL: CPT | Performed by: NURSE PRACTITIONER

## 2025-02-24 PROCEDURE — 85025 COMPLETE CBC W/AUTO DIFF WBC: CPT | Performed by: NURSE PRACTITIONER

## 2025-02-24 PROCEDURE — 71046 X-RAY EXAM CHEST 2 VIEWS: CPT | Performed by: NURSE PRACTITIONER

## 2025-02-24 NOTE — PROGRESS NOTES
[facility]   PROGRESS NOTE  Chief Complaint:   Chief Complaint   Patient presents with    Follow - Up       HPI:   This is a 29 year old female coming in for noticed blood in her sputum after vomiting one week ago , vomited  for 2 days one week ago , noticed blood small amount twice.   Denies any other symptoms     Results for orders placed or performed during the hospital encounter of 06/13/24   CBC, Platelet; No Differential    Collection Time: 06/13/24 12:00 PM   Result Value Ref Range    WBC 6.3 4.0 - 11.0 x10(3) uL    RBC 4.56 3.80 - 5.30 x10(6)uL    HGB 13.9 12.0 - 16.0 g/dL    HCT 41.5 35.0 - 48.0 %    .0 150.0 - 450.0 10(3)uL    MCV 91.0 80.0 - 100.0 fL    MCH 30.5 26.0 - 34.0 pg    MCHC 33.5 31.0 - 37.0 g/dL    RDW 11.9 %       Past Medical History:    Anxiety    Depression    PONV (postoperative nausea and vomiting)    Nausea/Vomiting when traveling home after ACE done with conscious sedation    Scoliosis     Past Surgical History:   Procedure Laterality Date    Adenoidectomy      Hysterectomy  2022    Removal gallbladder      11/2017    Spine scoliosis supine/upright      2010    Tonsillectomy       Social History:  Social History     Socioeconomic History    Marital status:    Tobacco Use    Smoking status: Every Day    Smokeless tobacco: Never   Vaping Use    Vaping status: Never Used   Substance and Sexual Activity    Alcohol use: No    Drug use: No    Sexual activity: Yes     Partners: Male     Birth control/protection: Pill   Other Topics Concern    Caffeine Concern Yes     Comment: 1 cup daily     Exercise Yes     Comment: walking and biking     Seat Belt Yes     Family History:  Family History   Problem Relation Age of Onset    Other (lung cancer) Father 56    Other (thyroid (hypo)) Maternal Grandmother     Heart Disease Maternal Grandfather     Other (lung cancer) Paternal Grandfather      Allergies:  Allergies[1]  Current Meds:  Current Outpatient Medications   Medication  Sig Dispense Refill    aspirin 81 MG Oral Chew Tab Chew 1 tablet (81 mg total) by mouth daily. 90 tablet 0    DULoxetine 60 MG Oral Cap DR Particles Take 1 capsule (60 mg total) by mouth daily.        Ready to quit: Not Answered  Counseling given: Not Answered       REVIEW OF SYSTEMS:   CONSTITUTIONAL:  Denies fever, chills, or fatigue.  EENT:  Eyes:  Denies eye pain, visual loss, blurred vision, double vision or yellow sclerae. Ears, Nose, Throat:  Denies hearing loss, sneezing, congestion, runny nose or sore throat.  INTEGUMENTARY:  Denies rashes, itching, skin lesion, or excessive skin dryness.  CARDIOVASCULAR:  Denies chest pain, chest pressure, chest discomfort, palpitations, edema, dyspnea on exertion or at rest.  RESPIRATORY:  Denies shortness of breath, wheezing, cough or sputum.  GASTROINTESTINAL:  Denies abdominal pain, nausea, vomiting, constipation, diarrhea, or blood in stool.  MUSCULOSKELETAL:  Denies weakness, muscle aches, back pain, joint pain, swelling or stiffness.  NEUROLOGICAL:  Denies headache, seizures, dizziness, syncope, paralysis, ataxia, numbness or tingling in the extremities,change in bowel or bladder control.  HEMATOLOGIC:  Denies anemia, bleeding or bruising.  LYMPHATICS:  Denies enlarged nodes or history of splenectomy.  PSYCHIATRIC:  Denies depression or anxiety.  ENDOCRINOLOGIC:  Denies excessive sweating, cold or heat intolerance, polyuria or polydipsia.  ALLERGIES:  Denies allergic response, history of asthma, sneezing, hives, eczema or rhinitis.     EXAM:   /80   Pulse 100   Resp 16   Ht 5' 3\" (1.6 m)   Wt 162 lb (73.5 kg)   LMP 03/30/2021 (Exact Date)   SpO2 98%   BMI 28.70 kg/m²  Estimated body mass index is 28.7 kg/m² as calculated from the following:    Height as of this encounter: 5' 3\" (1.6 m).    Weight as of this encounter: 162 lb (73.5 kg).   Vital signs reviewed.Appears stated age, well groomed.  Physical Exam:  GEN:  Patient is alert, awake and oriented,  in  no apparent distress.  HEENT:  Head:  Normocephalic, atraumatic Eyes: EOMI, PERRL, no scleral icterus, conjunctivae clear bilaterally, no eye discharge Ears: External normal. Nose: patent, no nasal discharge Throat:  No tonsillar erythema or exudate.  Mouth:  No oral lesions or ulcerations, good dentition.  NECK: Supple, no CLAD, no JVD, no thyromegaly.  SKIN: No rashes, no skin lesion, no bruising, good turgor.  HEART:  Regular rate and rhythm, no murmurs, rubs or gallops.  LUNGS: Clear to auscultation bilterally, no rales/rhonchi/wheezing.  EXTREMITIES:  No edema, no cyanosis, no clubbing, FROM,  NEURO:  No deficit, normal gait, strength and tone, sensory intact,    ASSESSMENT AND PLAN:   Diagnoses and all orders for this visit:    Hemoptysis  -     H. Pylori Stool Ag, EIA [E]; Future  -     XR CHEST PA + LAT CHEST (CPT=71046); Future  -     Occult Blood, Fecal, Immunoassay (Blue cards) [E]; Future  -     CBC With Differential With Platelet; Future  -     Comp Metabolic Panel (14) [E]; Future    Hematuria, unspecified type  -     URINALYSIS, AUTO, W/O SCOPE         Meds & Refills for this Visit:  Requested Prescriptions      No prescriptions requested or ordered in this encounter       Health Maintenance:  Health Maintenance Due   Topic Date Due    Annual Physical  Never done    Pneumococcal Vaccine: Birth to 50yrs (1 of 2 - PCV) Never done    DTaP,Tdap,and Td Vaccines (1 - Tdap) Never done    COVID-19 Vaccine (2 - 2024-25 season) 09/01/2024    Influenza Vaccine (1) 10/01/2024    Annual Depression Screening  01/01/2025    Tobacco Cessation Counseling  Never done       Patient/Caregiver Education: Patient/Caregiver Education: There are no barriers to learning. Medical education done.   Outcome: Patient verbalizes understanding. Patient is notified to call with any questions, complications, allergies, or worsening or changing symptoms.  Patient is to call with any side effects or complications from the  treatments as a result of today.     Problem List:  Patient Active Problem List   Diagnosis    Scoliosis    Anxiety and depression    History of anemia    Right ovarian cyst    Dysmenorrhea    Irregular menses       Dwayne Encarnacion, EDUARDO  2/24/2025  1:52 PM         [1] No Known Allergies

## 2025-02-25 DIAGNOSIS — R79.89 LOW SERUM SODIUM: ICD-10-CM

## 2025-02-25 DIAGNOSIS — E87.6 LOW SERUM POTASSIUM: Primary | ICD-10-CM

## 2025-02-28 ENCOUNTER — LAB ENCOUNTER (OUTPATIENT)
Dept: LAB | Age: 30
End: 2025-02-28
Attending: NURSE PRACTITIONER
Payer: COMMERCIAL

## 2025-03-04 ENCOUNTER — LAB ENCOUNTER (OUTPATIENT)
Dept: LAB | Age: 30
End: 2025-03-04
Attending: NURSE PRACTITIONER
Payer: COMMERCIAL

## 2025-03-04 DIAGNOSIS — Z01.818 PRE-OP EXAM: ICD-10-CM

## 2025-03-04 LAB
ALBUMIN SERPL-MCNC: 4.7 G/DL (ref 3.2–4.8)
ALBUMIN/GLOB SERPL: 1.7 {RATIO} (ref 1–2)
ALP LIVER SERPL-CCNC: 57 U/L
ALT SERPL-CCNC: 19 U/L
ANION GAP SERPL CALC-SCNC: 4 MMOL/L (ref 0–18)
AST SERPL-CCNC: 23 U/L (ref ?–34)
BASOPHILS # BLD AUTO: 0.03 X10(3) UL (ref 0–0.2)
BASOPHILS NFR BLD AUTO: 0.6 %
BILIRUB SERPL-MCNC: 0.5 MG/DL (ref 0.3–1.2)
BUN BLD-MCNC: 11 MG/DL (ref 9–23)
CALCIUM BLD-MCNC: 8.9 MG/DL (ref 8.7–10.6)
CHLORIDE SERPL-SCNC: 102 MMOL/L (ref 98–112)
CO2 SERPL-SCNC: 31 MMOL/L (ref 21–32)
CREAT BLD-MCNC: 0.92 MG/DL
EGFRCR SERPLBLD CKD-EPI 2021: 86 ML/MIN/1.73M2 (ref 60–?)
EOSINOPHIL # BLD AUTO: 0.12 X10(3) UL (ref 0–0.7)
EOSINOPHIL NFR BLD AUTO: 2.3 %
ERYTHROCYTE [DISTWIDTH] IN BLOOD BY AUTOMATED COUNT: 11.9 %
FASTING STATUS PATIENT QL REPORTED: YES
GLOBULIN PLAS-MCNC: 2.7 G/DL (ref 2–3.5)
GLUCOSE BLD-MCNC: 91 MG/DL (ref 70–99)
HCT VFR BLD AUTO: 40.6 %
HGB BLD-MCNC: 13.3 G/DL
IMM GRANULOCYTES # BLD AUTO: 0.01 X10(3) UL (ref 0–1)
IMM GRANULOCYTES NFR BLD: 0.2 %
LYMPHOCYTES # BLD AUTO: 1.54 X10(3) UL (ref 1–4)
LYMPHOCYTES NFR BLD AUTO: 30.1 %
MCH RBC QN AUTO: 30 PG (ref 26–34)
MCHC RBC AUTO-ENTMCNC: 32.8 G/DL (ref 31–37)
MCV RBC AUTO: 91.6 FL
MONOCYTES # BLD AUTO: 0.38 X10(3) UL (ref 0.1–1)
MONOCYTES NFR BLD AUTO: 7.4 %
NEUTROPHILS # BLD AUTO: 3.04 X10 (3) UL (ref 1.5–7.7)
NEUTROPHILS # BLD AUTO: 3.04 X10(3) UL (ref 1.5–7.7)
NEUTROPHILS NFR BLD AUTO: 59.4 %
OSMOLALITY SERPL CALC.SUM OF ELEC: 283 MOSM/KG (ref 275–295)
PLATELET # BLD AUTO: 187 10(3)UL (ref 150–450)
POTASSIUM SERPL-SCNC: 4.2 MMOL/L (ref 3.5–5.1)
PROT SERPL-MCNC: 7.4 G/DL (ref 5.7–8.2)
RBC # BLD AUTO: 4.43 X10(6)UL
SODIUM SERPL-SCNC: 137 MMOL/L (ref 136–145)
WBC # BLD AUTO: 5.1 X10(3) UL (ref 4–11)

## 2025-03-04 PROCEDURE — 85025 COMPLETE CBC W/AUTO DIFF WBC: CPT | Performed by: NURSE PRACTITIONER

## 2025-03-04 PROCEDURE — 80053 COMPREHEN METABOLIC PANEL: CPT | Performed by: NURSE PRACTITIONER

## 2025-04-09 ENCOUNTER — OFFICE VISIT (OUTPATIENT)
Dept: FAMILY MEDICINE CLINIC | Facility: CLINIC | Age: 30
End: 2025-04-09
Payer: COMMERCIAL

## 2025-04-09 ENCOUNTER — LAB ENCOUNTER (OUTPATIENT)
Dept: LAB | Age: 30
End: 2025-04-09
Attending: NURSE PRACTITIONER
Payer: COMMERCIAL

## 2025-04-09 VITALS
HEART RATE: 88 BPM | RESPIRATION RATE: 16 BRPM | HEIGHT: 63 IN | OXYGEN SATURATION: 98 % | WEIGHT: 159 LBS | BODY MASS INDEX: 28.17 KG/M2 | SYSTOLIC BLOOD PRESSURE: 110 MMHG | DIASTOLIC BLOOD PRESSURE: 70 MMHG

## 2025-04-09 DIAGNOSIS — Z01.818 PREOPERATIVE GENERAL PHYSICAL EXAMINATION: Primary | ICD-10-CM

## 2025-04-09 DIAGNOSIS — N80.9 ENDOMETRIOSIS: ICD-10-CM

## 2025-04-09 DIAGNOSIS — Z01.818 PREOPERATIVE GENERAL PHYSICAL EXAMINATION: ICD-10-CM

## 2025-04-09 LAB
ALBUMIN SERPL-MCNC: 4.6 G/DL (ref 3.2–4.8)
ALBUMIN/GLOB SERPL: 1.8 {RATIO} (ref 1–2)
ALP LIVER SERPL-CCNC: 63 U/L (ref 37–98)
ALT SERPL-CCNC: 18 U/L (ref 10–49)
ANION GAP SERPL CALC-SCNC: 8 MMOL/L (ref 0–18)
AST SERPL-CCNC: 19 U/L (ref ?–34)
BASOPHILS # BLD AUTO: 0.02 X10(3) UL (ref 0–0.2)
BASOPHILS NFR BLD AUTO: 0.4 %
BILIRUB SERPL-MCNC: 0.4 MG/DL (ref 0.3–1.2)
BUN BLD-MCNC: 7 MG/DL (ref 9–23)
CALCIUM BLD-MCNC: 9.6 MG/DL (ref 8.7–10.6)
CHLORIDE SERPL-SCNC: 103 MMOL/L (ref 98–112)
CO2 SERPL-SCNC: 30 MMOL/L (ref 21–32)
CREAT BLD-MCNC: 0.86 MG/DL (ref 0.55–1.02)
EGFRCR SERPLBLD CKD-EPI 2021: 94 ML/MIN/1.73M2 (ref 60–?)
EOSINOPHIL # BLD AUTO: 0.15 X10(3) UL (ref 0–0.7)
EOSINOPHIL NFR BLD AUTO: 3.2 %
ERYTHROCYTE [DISTWIDTH] IN BLOOD BY AUTOMATED COUNT: 11.9 %
FASTING STATUS PATIENT QL REPORTED: NO
GLOBULIN PLAS-MCNC: 2.6 G/DL (ref 2–3.5)
GLUCOSE BLD-MCNC: 93 MG/DL (ref 70–99)
HCT VFR BLD AUTO: 40 % (ref 35–48)
HGB BLD-MCNC: 13 G/DL (ref 12–16)
IMM GRANULOCYTES # BLD AUTO: 0.01 X10(3) UL (ref 0–1)
IMM GRANULOCYTES NFR BLD: 0.2 %
LYMPHOCYTES # BLD AUTO: 1.42 X10(3) UL (ref 1–4)
LYMPHOCYTES NFR BLD AUTO: 30.1 %
MCH RBC QN AUTO: 29.3 PG (ref 26–34)
MCHC RBC AUTO-ENTMCNC: 32.5 G/DL (ref 31–37)
MCV RBC AUTO: 90.3 FL (ref 80–100)
MONOCYTES # BLD AUTO: 0.3 X10(3) UL (ref 0.1–1)
MONOCYTES NFR BLD AUTO: 6.4 %
NEUTROPHILS # BLD AUTO: 2.82 X10 (3) UL (ref 1.5–7.7)
NEUTROPHILS # BLD AUTO: 2.82 X10(3) UL (ref 1.5–7.7)
NEUTROPHILS NFR BLD AUTO: 59.7 %
OSMOLALITY SERPL CALC.SUM OF ELEC: 290 MOSM/KG (ref 275–295)
PLATELET # BLD AUTO: 169 10(3)UL (ref 150–450)
POTASSIUM SERPL-SCNC: 3.7 MMOL/L (ref 3.5–5.1)
PROT SERPL-MCNC: 7.2 G/DL (ref 5.7–8.2)
RBC # BLD AUTO: 4.43 X10(6)UL (ref 3.8–5.3)
SODIUM SERPL-SCNC: 141 MMOL/L (ref 136–145)
WBC # BLD AUTO: 4.7 X10(3) UL (ref 4–11)

## 2025-04-09 PROCEDURE — 80053 COMPREHEN METABOLIC PANEL: CPT | Performed by: NURSE PRACTITIONER

## 2025-04-09 PROCEDURE — 3074F SYST BP LT 130 MM HG: CPT | Performed by: NURSE PRACTITIONER

## 2025-04-09 PROCEDURE — 85025 COMPLETE CBC W/AUTO DIFF WBC: CPT | Performed by: NURSE PRACTITIONER

## 2025-04-09 PROCEDURE — 3008F BODY MASS INDEX DOCD: CPT | Performed by: NURSE PRACTITIONER

## 2025-04-09 PROCEDURE — 99214 OFFICE O/P EST MOD 30 MIN: CPT | Performed by: NURSE PRACTITIONER

## 2025-04-09 PROCEDURE — 3078F DIAST BP <80 MM HG: CPT | Performed by: NURSE PRACTITIONER

## 2025-04-09 NOTE — PROGRESS NOTES
CC: Preop exam.    Jocelyn Lorenz is a 29 year old female who presents for a pre-operative physical exam  By Dr. Eugenio huynh. Patient is to have robotic assisted laparoscopic excision of endometriosis, vaginal biopsy, pudendal nerve block, pelvic trigger point injections , possible revision vaginal cuff, possible cystoscopy to be on 4/28/25   No prior anaesthesia problem.      Current Medications[1]   Allergies: Allergies[2]   Past Medical History[3]   Past Surgical History[4]   Family History[5]   Social History:   Short Social Hx on File[6]        REVIEW OF SYSTEMS:   GENERAL: feels well otherwise  SKIN: denies any unusual skin lesions  EYES:denies blurred vision or double vision  HEENT: denies nasal congestion, sinus pain or ST  LUNGS: denies shortness of breath with exertion  CARDIOVASCULAR: denies chest pain on exertion  GI: denies abdominal pain,denies heartburn  : no abnormal discharge  MUSCULOSKELETAL: denies back pain  NEURO: denies headaches  PSYCHE: denies depression or anxiety  HEMATOLOGIC: denies hx of anemia  ENDOCRINE: denies thyroid history  ALL/ASTHMA: denies hx of allergy or asthma    EXAM:   /70   Pulse 88   Resp 16   Ht 5' 3\" (1.6 m)   Wt 159 lb (72.1 kg)   LMP 03/30/2021 (Exact Date)   SpO2 98%   BMI 28.17 kg/m²   GENERAL: well developed, well nourished,in no apparent distress  SKIN: no rashes,no suspicious lesions  HEENT: atraumatic, normocephalic,ears and throat are clear  EYES:PERRLA, EOMI, normal optic disk,conjunctiva are clear  NECK: supple,no adenopathy,no bruits  CHEST: no chest tenderness  LUNGS: clear to auscultation  CARDIO: RRR without murmur  GI: good BS's,no masses, HSM or tenderness  : deferred  MUSCULOSKELETAL: back is not tender,FROM of the back  EXTREMITIES: no cyanosis, clubbing or edema  NEURO: Oriented times three,cranial nerves are intact,motor and sensory are grossly intact    ASSESSMENT AND PLAN:   Jocelyn Lorenz is a 29 year old female who  presents for a pre-operative physical exam. Labs stable, patient is medically cleared for surgery.   Diagnoses and all orders for this visit:    Preoperative general physical examination  -     CBC With Differential With Platelet; Future  -     Comp Metabolic Panel (14) [E]; Future    Endometriosis  Stable   May proceed with procedure              [1]   Current Outpatient Medications   Medication Sig Dispense Refill    aspirin 81 MG Oral Chew Tab Chew 1 tablet (81 mg total) by mouth daily. 90 tablet 0    DULoxetine 60 MG Oral Cap DR Particles Take 1 capsule (60 mg total) by mouth daily.     [2] No Known Allergies  [3]   Past Medical History:   Anxiety    Depression    PONV (postoperative nausea and vomiting)    Nausea/Vomiting when traveling home after ACE done with conscious sedation    Scoliosis   [4]   Past Surgical History:  Procedure Laterality Date    Adenoidectomy      Hysterectomy  2022    Removal gallbladder      11/2017    Spine scoliosis supine/upright      2010    Tonsillectomy     [5]   Family History  Problem Relation Age of Onset    Other (lung cancer) Father 56    Other (thyroid (hypo)) Maternal Grandmother     Heart Disease Maternal Grandfather     Other (lung cancer) Paternal Grandfather    [6]   Social History  Socioeconomic History    Marital status:    Tobacco Use    Smoking status: Every Day    Smokeless tobacco: Never   Vaping Use    Vaping status: Never Used   Substance and Sexual Activity    Alcohol use: No    Drug use: No    Sexual activity: Yes     Partners: Male     Birth control/protection: Pill   Other Topics Concern    Caffeine Concern Yes     Comment: 1 cup daily     Exercise Yes     Comment: walking and biking     Seat Belt Yes

## (undated) NOTE — LETTER
09/04/19        [id number] [facility].   [place] [id number]      Dear Nata Arzate,    [id number] records indicate that you have outstanding lab work and or testing that was ordered for you and has not yet been completed:  Orders Placed This Encounter      TSH